# Patient Record
Sex: MALE
[De-identification: names, ages, dates, MRNs, and addresses within clinical notes are randomized per-mention and may not be internally consistent; named-entity substitution may affect disease eponyms.]

---

## 2017-06-11 ENCOUNTER — DIAGNOSTIC TRANS (OUTPATIENT)
Dept: OTHER | Age: 37
End: 2017-06-11

## 2017-06-11 ENCOUNTER — HOSPITAL (OUTPATIENT)
Dept: OTHER | Age: 37
End: 2017-06-11
Attending: INTERNAL MEDICINE

## 2017-06-11 LAB
# SPEC OBTAINED: 4
ALBUMIN SERPL-MCNC: 3.2 GM/DL (ref 3.6–5.1)
ALBUMIN/GLOB SERPL: 0.8 {RATIO} (ref 1–2.4)
ALP SERPL-CCNC: 121 UNIT/L (ref 45–117)
ALT SERPL-CCNC: 26 UNIT/L
ANALYZER ANC (IANC): NORMAL
ANION GAP SERPL CALC-SCNC: 14 MMOL/L (ref 10–20)
APPEARANCE CSF: ABNORMAL
AST SERPL-CCNC: 32 UNIT/L
BASOPHILS # BLD: 0.1 THOUSAND/MCL (ref 0–0.3)
BASOPHILS NFR BLD: 1 %
BASOPHILS NFR CSF: ABNORMAL %
BILIRUB SERPL-MCNC: 0.4 MG/DL (ref 0.2–1)
BUN SERPL-MCNC: 12 MG/DL (ref 6–20)
BUN/CREAT SERPL: 13 (ref 7–25)
CALCIUM SERPL-MCNC: 8.6 MG/DL (ref 8.4–10.2)
CHLORIDE: 104 MMOL/L (ref 98–107)
CO2 SERPL-SCNC: 27 MMOL/L (ref 21–32)
CREAT SERPL-MCNC: 0.96 MG/DL (ref 0.67–1.17)
CSF DIFFERENTIAL REMARKS(CREM): ABNORMAL
DIFFERENTIAL METHOD BLD: NORMAL
EOSINOPHIL # BLD: 0.2 THOUSAND/MCL (ref 0.1–0.5)
EOSINOPHIL NFR BLD: 2 %
EOSINOPHIL NFR CSF: ABNORMAL %
ERYTHROCYTE [DISTWIDTH] IN BLOOD: 13.1 % (ref 11–15)
GLOBULIN SER-MCNC: 4.2 GM/DL (ref 2–4)
GLUCOSE CSF-MCNC: 82 MG/DL (ref 40–70)
GLUCOSE SERPL-MCNC: 93 MG/DL (ref 65–99)
HEMATOCRIT: 43.1 % (ref 39–51)
HGB BLD-MCNC: 14.8 GM/DL (ref 13–17)
LYMPHOCYTES # BLD: 3.5 THOUSAND/MCL (ref 1–4.8)
LYMPHOCYTES NFR BLD: 33 %
LYMPHOCYTES NFR CSF: ABNORMAL %
MCH RBC QN AUTO: 32.3 PG (ref 26–34)
MCHC RBC AUTO-ENTMCNC: 34.3 GM/DL (ref 32–36.5)
MCV RBC AUTO: 94.1 FL (ref 78–100)
MONOCYTES # BLD: 0.6 THOUSAND/MCL (ref 0.3–0.9)
MONOCYTES NFR BLD: 5 %
MONOS+MACROS NFR CSF: ABNORMAL %
NEUTROPHILS # BLD: 6.3 THOUSAND/MCL (ref 1.8–7.7)
NEUTROPHILS NFR BLD: 59 %
NEUTS SEG NFR BLD: NORMAL %
NEUTS SEG NFR CSF: ABNORMAL %
NUC CELL # CSF: 1 /MCL (ref 0–10)
PERCENT NRBC: NORMAL
PLATELET # BLD: 348 THOUSAND/MCL (ref 140–450)
POTASSIUM SERPL-SCNC: 4.3 MMOL/L (ref 3.4–5.1)
PROT CSF-MCNC: 40 MG/DL (ref 15–45)
PROT SERPL-MCNC: 7.4 GM/DL (ref 6.4–8.2)
RBC # BLD: 4.58 MILLION/MCL (ref 4.5–5.9)
RBC # CSF: 6 /MCL
RBC TUBE1 # CSF MANUAL: 22 CELLS/MCL
SODIUM SERPL-SCNC: 141 MMOL/L (ref 135–145)
SPECIMEN VOL CSF: 4 ML
TUBE # CSF: 4
WBC # BLD: 10.6 THOUSAND/MCL (ref 4.2–11)
XANTHOCHROMIA CSF QL: ABNORMAL

## 2018-01-16 NOTE — MISCELLANEOUS
Signatures   Electronically signed by : Noe De La Fuente, ; Jul 13 2016 10:08AM EST                       (Author)    Electronically signed by : LUCINA Cervantes ; Jul 13 2016 10:29AM EST                       (Author)

## 2018-01-24 NOTE — PROGRESS NOTES
Assessment    1  Right knee sprain (844 9) (S83 91XA)    Plan   I am concerned about an MCL tear as well as a medial meniscus tear  He has not had an MRI to date and the injury occurred over 3 months ago  I would like to have an MRI to further evaluate  If he has a medial meniscus tear found on MRI, he may require arthroscopic meniscectomy  If he has an MCL tear, we may continue with therapy and possibly have a brace  Discussion/Summary  The patient was counseled regarding instructions for management, prognosis, patient and family education, impressions, risks and benefits of treatment options, importance of compliance with treatment  Chief Complaint    1  Knee Injury   2  Knee Pain    History of Present Illness   Sb returns to see me today stating that his right knee continues to have pain  It is sharp and moderate and constant and worse with twisting and pivoting but somewhat better with physical therapy  The therapy and unfortunately however it has not helped him as much as he would like  This injury occurred about 3 months ago while he was incarcerated  Review of Systems    Constitutional: No fever or chills, feels well, no tiredness, no recent weight loss or weight gain  Eyes: No complaints of red eyes, no eyesight problems  ENT: no complaints of loss of hearing, no nosebleeds, no sore throat  Cardiovascular: No complaints of chest pain, no palpitations, no leg claudication or lower extremity edema  Respiratory: No complaints of shortness of breath, no wheezing, no cough  Gastrointestinal: No complaints of abdominal pain, no constipation, no nausea or vomiting, no diarrhea or bloody stools  Genitourinary: No complaints of dysuria or incontinence, no hesitancy, no nocturia  Musculoskeletal: as noted in HPI  Integumentary: No complaints of skin rash or lesion, no itching or dry skin, no skin wounds     Neurological: No complaints of headache, no confusion, no numbness or tingling, no dizziness  Psychiatric: No suicidal thoughts, no anxiety, no depression  Endocrine: No muscle weakness, no frequent urination, no excessive thirst, no feelings of weakness  Active Problems    1  Asthma (493 90) (J45 909)   2  Low back pain (724 2) (M54 5)   3  PTSD (post-traumatic stress disorder) (309 81) (F43 10)   4  Right knee pain (719 46) (M25 561)   5  Right knee sprain (844 9) (S83 91XA)   6  Rotator cuff tendinitis, left (726 10) (M75 82)   7  Spinal stenosis (724 00) (M48 00)   8  Spinal stenosis (724 00) (M48 00)    Past Medical History    The active problems and past medical history were reviewed and updated today  Surgical History    · History of Appendectomy    The surgical history was reviewed and updated today  Family History  Mother    · No pertinent family history  Father    · No pertinent family history    The family history was reviewed and updated today  Social History    · Former smoker (R99 31) (D55 771)  The social history was reviewed and updated today  Current Meds   1  Albuterol 90 MCG/ACT AERS; Therapy: (Recorded:20Jun2016) to Recorded   2  Amitriptyline HCl - 25 MG Oral Tablet; Therapy: (Recorded:20Jun2016) to Recorded   3  AmLODIPine Besylate 10 MG Oral Tablet; Therapy: (Bradley Avalos) to Recorded   4  Asmanex 120 Metered Doses 220 MCG/INH Inhalation Aerosol Powder Breath Activated;   INHALE 2 PUFFS TWICE DAILY; Last Rx:20Jun2016 Ordered   5  Cetirizine HCl - 10 MG Oral Tablet; Therapy: (Bradley Avalos) to Recorded   6  Gabapentin 600 MG Oral Tablet; Therapy: (Bradley Avalos) to Recorded   7  Lyrica 75 MG Oral Capsule; Therapy: (Bradley Avalos) to Recorded   8  Methocarbamol 750 MG Oral Tablet; TAKE 1 TABLET AT BEDTIME  Requested for:   20Jun2016; Last Rx:20Jun2016 Ordered   9  Naprosyn TABS (Naproxen); Therapy: (Recorded:13May2016) to Recorded   10  Naproxen 500 MG Oral Tablet;     Therapy: (Recorded:20Jun2016) to Recorded   11  Oxycodone-Acetaminophen 5-325 MG Oral Tablet; Therapy: (Recorded:20Jun2016) to Recorded   12  Proventil  (90 Base) MCG/ACT Inhalation Aerosol Solution; INHALE 2 PUFFS    EVERY 4 HOURS AS NEEDED FOR COUGH AND WHEEZE;    Therapy: 20Jun2016 to (Luis M Kevin Tony)  Requested for: 20Jun2016 Ordered   13  TraMADol HCl - 50 MG Oral Tablet; Therapy: (Recorded:20Jun2016) to Recorded   14  Zyrtec 10 MG TABS; Therapy: (Recorded:[id number]) to Recorded    The medication list was reviewed and updated today  Allergies    1  Lactose   2  Morphine Derivatives   3  Penicillins    4  Fish   5  Latex   6  Peanuts    Physical Exam    Right Knee: Appearance: an effusion grade 1  Tenderness: medial joint line  Flexion: painful restricted AROM 100 degrees  Extension: painless normal AROM  Flexion was 5/5  Extension was 5/5  Special Test: positive laxity on Valgus Stress, but negative Anterior Drawer sign, negative Posterior Drawer sign and no laxity on Varus Stress  Constitutional - General appearance: Normal    Musculoskeletal - Gait and station: Abnormal  Gait evaluation demonstrated antalgia on the right  Digits and nails: Normal  Muscle strength/tone: Normal  Lower extremity compartments: Normal    Cardiovascular - Pulses: Normal  Examination of extremities for edema and/or varicosities: Normal    Lymphatic - Palpation of lymph nodes in other areas: Normal  no right femoral node enlargement  Skin - Skin and subcutaneous tissue: Normal    Neurologic - Sensation: Normal  Lower extremity peripheral neuro exam: Normal    Psychiatric - Orientation to person, place, and time: Normal  Mood and affect: Normal    Eyes   Conjunctiva and lids: Normal     Pupils and irises: Normal        Future Appointments    Date/Time Provider Specialty Site   06/29/2016 02:20 PM LUCINA Sun  Orthopedic Surgery [facility]   07/01/2016 08:30 AM LUCINA Sun   Orthopedic Surgery  [facility]   07/13/2016 10:00 AM LUCINA Gilbert   Pain Management [facility] [place]     Signatures   Electronically signed by : LUCINA Olivarez ; Jun 24 2016  8:37AM EST                       (Author)

## 2024-08-26 ENCOUNTER — CONSULTATION (OUTPATIENT)
Dept: URBAN - METROPOLITAN AREA HOSPITAL 8 | Facility: HOSPITAL | Age: 44
End: 2024-08-26

## 2024-08-26 DIAGNOSIS — H53.483: ICD-10-CM

## 2024-08-26 DIAGNOSIS — G35: ICD-10-CM

## 2024-08-26 DIAGNOSIS — H53.453: ICD-10-CM

## 2024-08-26 PROCEDURE — 99284 EMERGENCY DEPT VISIT MOD MDM: CPT
